# Patient Record
Sex: MALE | Race: WHITE | NOT HISPANIC OR LATINO | Employment: OTHER | URBAN - METROPOLITAN AREA
[De-identification: names, ages, dates, MRNs, and addresses within clinical notes are randomized per-mention and may not be internally consistent; named-entity substitution may affect disease eponyms.]

---

## 2023-07-13 DIAGNOSIS — N18.32 STAGE 3B CHRONIC KIDNEY DISEASE: Primary | ICD-10-CM

## 2023-07-13 RX ORDER — LEVOTHYROXINE SODIUM 50 UG/1
50 TABLET ORAL DAILY
COMMUNITY

## 2023-07-13 RX ORDER — EPINEPHRINE 0.22MG
100 AEROSOL WITH ADAPTER (ML) INHALATION DAILY
COMMUNITY

## 2023-07-13 RX ORDER — ASPIRIN 81 MG/1
81 TABLET ORAL DAILY
COMMUNITY

## 2023-07-13 RX ORDER — SACUBITRIL AND VALSARTAN 24; 26 MG/1; MG/1
1 TABLET, FILM COATED ORAL 2 TIMES DAILY
COMMUNITY
Start: 2022-10-18

## 2023-07-13 RX ORDER — CARVEDILOL 12.5 MG/1
12.5 TABLET ORAL 2 TIMES DAILY
COMMUNITY
Start: 2023-04-19

## 2023-10-30 ENCOUNTER — OFFICE VISIT (OUTPATIENT)
Dept: NEPHROLOGY | Facility: CLINIC | Age: 88
End: 2023-10-30
Payer: OTHER GOVERNMENT

## 2023-10-30 VITALS
OXYGEN SATURATION: 97 % | SYSTOLIC BLOOD PRESSURE: 114 MMHG | BODY MASS INDEX: 27.31 KG/M2 | HEIGHT: 64 IN | WEIGHT: 160 LBS | DIASTOLIC BLOOD PRESSURE: 65 MMHG | HEART RATE: 64 BPM

## 2023-10-30 DIAGNOSIS — N18.32 STAGE 3B CHRONIC KIDNEY DISEASE: Primary | ICD-10-CM

## 2023-10-30 DIAGNOSIS — D63.1 ANEMIA IN STAGE 3 CHRONIC KIDNEY DISEASE, UNSPECIFIED WHETHER STAGE 3A OR 3B CKD: ICD-10-CM

## 2023-10-30 DIAGNOSIS — N18.30 ANEMIA IN STAGE 3 CHRONIC KIDNEY DISEASE, UNSPECIFIED WHETHER STAGE 3A OR 3B CKD: ICD-10-CM

## 2023-10-30 PROCEDURE — 99204 PR OFFICE/OUTPT VISIT, NEW, LEVL IV, 45-59 MIN: ICD-10-PCS | Mod: S$GLB,,, | Performed by: INTERNAL MEDICINE

## 2023-10-30 PROCEDURE — 99204 OFFICE O/P NEW MOD 45 MIN: CPT | Mod: S$GLB,,, | Performed by: INTERNAL MEDICINE

## 2023-10-30 RX ORDER — FUROSEMIDE 40 MG/1
40 TABLET ORAL DAILY
COMMUNITY
Start: 2023-10-30

## 2023-10-30 RX ORDER — MULTIVITAMIN
1 TABLET ORAL DAILY
COMMUNITY

## 2023-10-30 RX ORDER — ATORVASTATIN CALCIUM 40 MG/1
20 TABLET, FILM COATED ORAL NIGHTLY
COMMUNITY
Start: 2023-06-16

## 2023-10-30 RX ORDER — FENOFIBRATE 145 MG/1
145 TABLET, FILM COATED ORAL DAILY
COMMUNITY
Start: 2023-07-11

## 2023-10-30 RX ORDER — TAMSULOSIN HYDROCHLORIDE 0.4 MG/1
0.8 CAPSULE ORAL DAILY
COMMUNITY
Start: 2023-08-28

## 2023-10-30 NOTE — PROGRESS NOTES
Nephrology Clinic Note           Pt Name:  Andrea Zacarias  Pt :  1931  Pt MRN:  [id number]    Date: 10/30/2023  Provider: Wm Cruz      Chief Complaint:   Chief Complaint   Patient presents with    Chronic Kidney Disease     Cre 1.60, Gfr 40, Ckd stage 3B        HPI:  Andrea Zacarias is a 89 y.o. male presenting for CKD stage 3b.  History is significant for HTN, CHF, CAD - s/p cabg , CKD. He was referred from his PCP for worsening of his renal function in 2023 to 2.03, looks like baseline around . - . Recently started on SGLt, also on Entresto and Lasix for his chf.   No prior h/o kidney disease, no DM or NSAIDS.  Today reports intermittent swelling of his lower extremities, frequent urination.   Today in the office, no shortness of breath, no chest pain, no fever, no dysuria, no hematuria, no diarrhea, or constipation            Review of Systems   Gen: no fever, chills, fatigue, weight loss/gain  HEENT: no vision changes, no hearing deficits, no nasal discharge  Respiratory: no cough, dyspnea  CVS: no chest pain, PND, orthopnea  Abd: no nausea, vomiting, abdominal pain, diarrhea, constipation  : no hematuria, dysuria, urinary retention  Ext: no edema, joint and muscle pains  Neuro: no weakness, no numbness  Skin: no rashes, no lesions  Psych: no depression, no anxiety    History:   Past Medical History:   Diagnosis Date    CHF (congestive heart failure)     Coronary artery disease     Essential (primary) hypertension     History of thyroid disease     Mixed hyperlipidemia     Paroxysmal atrial fibrillation 2013    S/P CABG x 3      Past Surgical History:   Procedure Laterality Date    CARDIAC CATHETERIZATION      CHOLECYSTECTOMY      CORONARY ANGIOPLASTY      CORONARY ARTERY BYPASS GRAFT      HERNIA REPAIR       History reviewed. No pertinent family history.  Social History     Substance and Sexual Activity   Alcohol Use Yes    Alcohol/week: 1.0 standard drink of alcohol     "Types: 1 Shots of liquor per week     Social History     Substance and Sexual Activity   Drug Use Not on file     Social History     Substance and Sexual Activity   Sexual Activity Not Currently     reports that he is not currently sexually active.  Social History     Tobacco Use   Smoking Status Never   Smokeless Tobacco Never       Allergies:  Review of patient's allergies indicates:  No Known Allergies    [unfilled]       Physical Exam  Vitals:   Vitals:    10/30/23 1310 10/30/23 1314   BP: 114/65    Pulse: 64    SpO2: 97%    Weight: 72.6 kg (160 lb)    Height:  5' 4" (1.626 m)     Body mass index is 27.46 kg/m².    Vital signs:   Wt Readings from Last 3 Encounters:   10/30/23 72.6 kg (160 lb)     Temp Readings from Last 3 Encounters:   No data found for Temp     BP Readings from Last 3 Encounters:   10/30/23 114/65     Pulse Readings from Last 3 Encounters:   10/30/23 64       Physical Exam    GENERAL ASSESSMENT: awake and alert in no acute distress.  Eyes: anicteric sclera, no erythema or discharge.  HENT: Normocephalic, atraumatic, moist mucus membranes  Neck: Supple, no thyromegaly or lymphadenopathy   SKIN EXAM: no rash, ecchymosis.  Cardiovascular: regular rate and rhythm, S1 S2 heard. Noo  murmurs, rubs or gallops.  Respiratory: no   rales, no wheezes or rhonchi  GI: BS+, NT, ND, no organomegaly.  : no reynolds   MSK: 1+ edema edema. No joint stiffness, effusions  NEURO: alert and oriented,  PSYCH: answers questions appropriately, organized thinking   Nails: no  clubbing, no  pallor      Labs/Tests:  Lab Results   Component Value Date     02/12/2021    K 4.1 02/12/2021     (H) 02/12/2021    CO2 30 02/12/2021    BUN 12 02/12/2021    CREATININE 1.02 02/12/2021    GLUCOSE 4+ (A) 10/27/2023    CALCIUM 9.8 02/12/2021    ALBUMIN 3.6 02/12/2021    EGFRNONAA 65 (L) 02/12/2021    ESTGFRAFRICA 75 (L) 02/12/2021     (H) 10/27/2023    HGB 11.6 (L) 02/12/2021                                   Renal US " not available :  Impression:    Assessment & Plan:         Visit Diagnosis  1. Stage 3b chronic kidney disease    2. 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  CHF - on lasix from his cardiologist, on entresto and SGLT. Can take lasix every other day  CAD -s /p cabg - on beta blocker.  CKD - stage 3b - renal function on the baseline, risk factors that are associated with kidney disease and its progression, discussed at length with the patient. Follow up in 3 months. Talked about SGLt and the risk for UTI - symptom free this visit.  Anemia in ckd - check iron level, on b12, check folate   BPH - on flomax, check u/s before next vist.       Orders this visit   Orders Placed This Encounter   Procedures    Iron and TIBC    Ferritin    Hemoglobin    PTH, Intact    Protein/Creatinine Ratio, Urine    Renal Function Panel    Uric Acid    Urinalysis    Vitamin D    Folate          Follow Up:   Follow up in about 3 months (around 1/30/2024).    Wm Cruz M.D.  Nephrology  10/30/2023  1:11 PM